# Patient Record
Sex: MALE
[De-identification: names, ages, dates, MRNs, and addresses within clinical notes are randomized per-mention and may not be internally consistent; named-entity substitution may affect disease eponyms.]

---

## 2024-07-11 ENCOUNTER — CARE COORDINATION (OUTPATIENT)
Dept: OTHER | Facility: CLINIC | Age: 69
End: 2024-07-11

## 2024-07-11 NOTE — CARE COORDINATION
Ambulatory Care Coordination Note     7/11/2024 4:32 PM     Received email response from [facility] that pt's location has been contacted and notified of need for amylodipine refill.     Akosua Balbuena RN BSN  [phone]

## 2024-07-11 NOTE — CARE COORDINATION
Ambulatory Care Coordination Note     2024 12:31 PM     Patient Current Location:  [place]     This patient was received as a referral from Population health report .    [facility] contacted the patient by telephone. (Patient returned call to [facility]) Verified name and  with patient as identifiers. Provided introduction to self, and explanation of the ACM role.   Patient accepted care management services at this time.          ACM: Akosua Balbuena RN     Challenges to be reviewed by the provider   Additional needs identified to be addressed with provider Yes  medications-Amylodipine needs refill ASAP. Email sent to [facility].               Method of communication with provider: chart routing.    Care Summary Note: Patient returned call to [facility] with noted anxiety. Patient states he is \"feeling fine,\" and all tests were \"fine\" in ED, and the \"medication problem with heart was my fault.\" He states he is taking his last available pill of amylodipine and he called pharmacy but they said they cannot get it until Tuesday. Pharmacy is [facility] in [place] ([phone]). ACM assured him I would call pharmacy to inquire about the delay and work to get this filled for him. He is very concerned as ED told him he would need a CT scan for a lung nodule found on x-ray. He has an apptmt scheduled with PCP Dr. Noemi Brown at [facility] on 24. Pt states his daughter brought him a blood pressure cuff and AC provided education on daily and as needed BP monitoring/logging and use of the device. Pt knowledgeable about red flags and when/where to seek treatment. Pt agreed to enroll in Care Management program and receive call from [facility] tomorrow morning for completion of enrollment assessments. 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 Assured pt [facility] would call before tomorrow with update on medication refill status.     Offered patient enrollment in the Remote

## 2024-07-11 NOTE — CARE COORDINATION
Ambulatory Care Coordination Note     7/11/2024 4:33 PM     Called patient to notify him that message has been relayed to [facility] of need for amylodipine refill. Patient expressed thanks for the call.      Akosua Balbuena RN BSN  [phone]

## 2024-07-11 NOTE — CARE COORDINATION
Ambulatory Care Coordination Note     7/11/2024 9:35 AM     Patient outreach attempt by this AC today to offer care management services. ACM was unable to reach the patient by telephone today; left voice message requesting a return phone call to this ACM.     ACM: Akosua Balbuena RN     Care Summary Note: Unable to reach patient.      Follow Up:   Plan for next ACM outreach in approximately 1-2 days  to complete:  - outreach attempt to offer care management services.      Akosua Balbuena RN BSN  [phone]

## 2024-07-12 ENCOUNTER — CARE COORDINATION (OUTPATIENT)
Dept: OTHER | Facility: CLINIC | Age: 69
End: 2024-07-12

## 2024-07-12 RX ORDER — TRIAMTERENE AND HYDROCHLOROTHIAZIDE 37.5; 25 MG/1; MG/1
1 TABLET ORAL DAILY
COMMUNITY

## 2024-07-12 RX ORDER — AMLODIPINE BESYLATE 5 MG/1
5 TABLET ORAL DAILY
COMMUNITY

## 2024-07-12 RX ORDER — VITAMIN B COMPLEX
1 CAPSULE ORAL DAILY
COMMUNITY

## 2024-07-12 RX ORDER — LISINOPRIL 40 MG/1
40 TABLET ORAL DAILY
COMMUNITY

## 2024-07-12 RX ORDER — ATORVASTATIN CALCIUM 20 MG/1
20 TABLET, FILM COATED ORAL DAILY
COMMUNITY

## 2024-07-12 RX ORDER — M-VIT,TX,IRON,MINS/CALC/FOLIC 27MG-0.4MG
1 TABLET ORAL DAILY
COMMUNITY

## 2024-07-12 RX ORDER — ZINC GLUCONATE 50 MG
30 TABLET ORAL DAILY
COMMUNITY

## 2024-07-12 SDOH — ECONOMIC STABILITY: HOUSING INSECURITY
IN THE LAST 12 MONTHS, WAS THERE A TIME WHEN YOU DID NOT HAVE A STEADY PLACE TO SLEEP OR SLEPT IN A SHELTER (INCLUDING NOW)?: NO

## 2024-07-12 SDOH — SOCIAL STABILITY: SOCIAL INSECURITY: WITHIN THE LAST YEAR, HAVE YOU BEEN AFRAID OF YOUR PARTNER OR EX-PARTNER?: NO

## 2024-07-12 SDOH — HEALTH STABILITY: PHYSICAL HEALTH: ON AVERAGE, HOW MANY DAYS PER WEEK DO YOU ENGAGE IN MODERATE TO STRENUOUS EXERCISE (LIKE A BRISK WALK)?: 5 DAYS

## 2024-07-12 SDOH — ECONOMIC STABILITY: HOUSING INSECURITY: IN THE LAST 12 MONTHS, HOW MANY PLACES HAVE YOU LIVED?: 1

## 2024-07-12 SDOH — ECONOMIC STABILITY: INCOME INSECURITY: HOW HARD IS IT FOR YOU TO PAY FOR THE VERY BASICS LIKE FOOD, HOUSING, MEDICAL CARE, AND HEATING?: NOT VERY HARD

## 2024-07-12 SDOH — SOCIAL STABILITY: SOCIAL INSECURITY
WITHIN THE LAST YEAR, HAVE TO BEEN RAPED OR FORCED TO HAVE ANY KIND OF SEXUAL ACTIVITY BY YOUR PARTNER OR EX-PARTNER?: NO

## 2024-07-12 SDOH — HEALTH STABILITY: MENTAL HEALTH
STRESS IS WHEN SOMEONE FEELS TENSE, NERVOUS, ANXIOUS, OR CAN'T SLEEP AT NIGHT BECAUSE THEIR MIND IS TROUBLED. HOW STRESSED ARE YOU?: TO SOME EXTENT

## 2024-07-12 SDOH — SOCIAL STABILITY: SOCIAL NETWORK
DO YOU BELONG TO ANY CLUBS OR ORGANIZATIONS SUCH AS CHURCH GROUPS UNIONS, FRATERNAL OR ATHLETIC GROUPS, OR SCHOOL GROUPS?: YES

## 2024-07-12 SDOH — HEALTH STABILITY: MENTAL HEALTH: HOW OFTEN DO YOU HAVE A DRINK CONTAINING ALCOHOL?: 4 OR MORE TIMES A WEEK

## 2024-07-12 SDOH — ECONOMIC STABILITY: FOOD INSECURITY: WITHIN THE PAST 12 MONTHS, YOU WORRIED THAT YOUR FOOD WOULD RUN OUT BEFORE YOU GOT MONEY TO BUY MORE.: NEVER TRUE

## 2024-07-12 SDOH — ECONOMIC STABILITY: FOOD INSECURITY: WITHIN THE PAST 12 MONTHS, THE FOOD YOU BOUGHT JUST DIDN'T LAST AND YOU DIDN'T HAVE MONEY TO GET MORE.: NEVER TRUE

## 2024-07-12 SDOH — SOCIAL STABILITY: SOCIAL INSECURITY
WITHIN THE LAST YEAR, HAVE YOU BEEN KICKED, HIT, SLAPPED, OR OTHERWISE PHYSICALLY HURT BY YOUR PARTNER OR EX-PARTNER?: NO

## 2024-07-12 SDOH — SOCIAL STABILITY: SOCIAL NETWORK: IN A TYPICAL WEEK, HOW MANY TIMES DO YOU TALK ON THE PHONE WITH FAMILY, FRIENDS, OR NEIGHBORS?: TWICE A WEEK

## 2024-07-12 SDOH — ECONOMIC STABILITY: INCOME INSECURITY: IN THE LAST 12 MONTHS, WAS THERE A TIME WHEN YOU WERE NOT ABLE TO PAY THE MORTGAGE OR RENT ON TIME?: NO

## 2024-07-12 SDOH — SOCIAL STABILITY: SOCIAL NETWORK: ARE YOU MARRIED, WIDOWED, DIVORCED, SEPARATED, NEVER MARRIED, OR LIVING WITH A PARTNER?: WIDOWED

## 2024-07-12 SDOH — SOCIAL STABILITY: SOCIAL INSECURITY: WITHIN THE LAST YEAR, HAVE YOU BEEN HUMILIATED OR EMOTIONALLY ABUSED IN OTHER WAYS BY YOUR PARTNER OR EX-PARTNER?: NO

## 2024-07-12 SDOH — ECONOMIC STABILITY: TRANSPORTATION INSECURITY
IN THE PAST 12 MONTHS, HAS LACK OF TRANSPORTATION KEPT YOU FROM MEETINGS, WORK, OR FROM GETTING THINGS NEEDED FOR DAILY LIVING?: NO

## 2024-07-12 SDOH — HEALTH STABILITY: MENTAL HEALTH: HOW MANY STANDARD DRINKS CONTAINING ALCOHOL DO YOU HAVE ON A TYPICAL DAY?: 1 OR 2

## 2024-07-12 SDOH — SOCIAL STABILITY: SOCIAL NETWORK: HOW OFTEN DO YOU GET TOGETHER WITH FRIENDS OR RELATIVES?: TWICE A WEEK

## 2024-07-12 SDOH — SOCIAL STABILITY: SOCIAL NETWORK: HOW OFTEN DO YOU ATTENT MEETINGS OF THE CLUB OR ORGANIZATION YOU BELONG TO?: MORE THAN 4 TIMES PER YEAR

## 2024-07-12 SDOH — SOCIAL STABILITY: SOCIAL NETWORK: HOW OFTEN DO YOU ATTEND CHURCH OR RELIGIOUS SERVICES?: MORE THAN 4 TIMES PER YEAR

## 2024-07-12 SDOH — HEALTH STABILITY: PHYSICAL HEALTH: ON AVERAGE, HOW MANY MINUTES DO YOU ENGAGE IN EXERCISE AT THIS LEVEL?: 120 MIN

## 2024-07-12 SDOH — ECONOMIC STABILITY: TRANSPORTATION INSECURITY
IN THE PAST 12 MONTHS, HAS THE LACK OF TRANSPORTATION KEPT YOU FROM MEDICAL APPOINTMENTS OR FROM GETTING MEDICATIONS?: NO

## 2024-07-12 NOTE — CARE COORDINATION
Ambulatory Care Coordination Note     7/12/2024 1:53 PM     Notified by [facility] that Cardiology referral has been sent to Dr. Mickey Alicia's office, and they will call patient to schedule appt.     Akosua Balbuena RN BSN  [phone]

## 2024-07-12 NOTE — CARE COORDINATION
Ambulatory Care Coordination Note     7/12/2024 3:46 PM     Patient called and left message for ACM, stating he repositioned the BP cuff and reading is now 168/89.     Akosua Balbuena RN BSN  [phone]

## 2024-07-12 NOTE — CARE COORDINATION
Ambulatory Care Coordination Note     7/12/2024 1:57 PM     Called pt to let him know referral to cardiology has been sent to Cardiology by [facility], and Dr. Alicia's office will call him to set up visit. Pt rechecked BP while on phone with CT Nurse. BP reading is 181/98.  Pt knowledgeable about red flags relevant to condition, and when/where to seek treatment. Encouraged pt to call ACM with any questions or concerns.     Akosua Balbuena RN BSN  [phone]

## 2024-07-12 NOTE — CARE COORDINATION
Ambulatory Care Coordination Note     2024 10:04 AM     Patient Current Location:  [place]     This patient was received as a referral from [facility] .    ACM contacted the patient by telephone. Verified name and  with patient as identifiers. Provided introduction to self, and explanation of the ACM role.   Patient accepted care management services at this time.          ACM: Akosua Balbuena RN     Challenges to be reviewed by the provider   Additional needs identified to be addressed with provider Yes  Patient needs referral to Cardiology - Pt requests Dr. Mickey Alicia   Per patient, he needs CT of chest for lung nodule found in ED.  Needs coordinated med refill dates.            Method of communication with provider: chart routing.    Care Summary Note: Patient reports he is \"doing good\" this morning. He picked up amlodipine last night and has started taking. He has started logging daily and PRN blood pressures and will bring to PCP visit on 24 with Dr. Brown. Blood pressure this AM before meds is 176/99. Patient checked BP during our call and it is 204/95, one hour after meds. He is asymptomatic and states he never has symptoms, but reports strong family history of cardiac disease and his father had MI. Patient has noted anxiety which lessened slightly over the course of the conversation. He states ED referred him to follow-up with Cardiology. He would like to see Dr. Mickey Alicia. ACM sent request to [facility], to make referral. He was also notified in ED that lung nodule was found on x-ray, which requires CT scan for follow-up. Full medication review was completed. Barriers to adherence include lack of coordinated refill dates and need for multiple trips to pharmacy. He will discuss this at next PCP appt. Patient states he has had HTN for at least three years and follows-up with PCP every six months. Pt states he \"doesn't normally worry about things,\" but voices

## 2024-07-19 ENCOUNTER — CARE COORDINATION (OUTPATIENT)
Dept: OTHER | Facility: CLINIC | Age: 69
End: 2024-07-19

## 2024-07-19 NOTE — CARE COORDINATION
appointment or reschedule if I have to cancel.  I will notify my provider of any barriers to my plan of care.  I will follow my Zone Management tool to seek urgent or emergent care.  I will notify my provider of any symptoms that indicate a worsening of my condition.    Barriers: fear of failure and stress  Plan for overcoming my barriers: working with ACM  Confidence: 10/10  Anticipated Goal Completion Date: 10/11/24    07/19/24 Patient has earliest available Cardiology appt scheduled for 09/17/24, and plans to follow-up with [facility] PCP on 07/24/24.          Remain at or below target Blood Pressure   On track      Check blood pressure daily each morning before medication  Log blood pressures daily   Check blood pressure if feeling any headaches, vision changes, shortness of breath, tightness in chest  Provide blood pressure log to PCP at visits  Cardiology consult     07/11/24 Patient verbalizes understanding and plans to implement these interventions today.   07/12/24 Patient has started daily BP log. BP this AM = 176/99 before meds  07/19/24 Patient is logging blood pressures daily SBPs running 150-170, DBPs running 80-90. BP this morning - 170/90               PCP/Specialist follow up:     Follow-up Information       Follow up With Specialties Details Why Contact Info    Noemi Reardon DO Family Medicine Go on 7/24/2024  [address] [facility] [address] [phone]      Mickey Alicia MD Cardiothoracic Surgery Go on 9/17/2024 New patient appt, ED Follow-up [address] [address]  [phone]               Follow Up:   Plan for next ACM outreach in approximately 1 week to complete:  - CC Protocol assessments  - disease specific assessments  - goal progression  - follow up appointment with PCP .   Patient  is agreeable to this plan.     Akosua Balbuena RN BSN  Ambulatory Care Manager  [phone]  [email or web address]

## 2024-07-19 NOTE — CARE COORDINATION
Ambulatory Care Coordination Note     7/19/2024 11:04 AM     Email sent to [facility] requesting CT Chest order be sent to diagnostics location near [facility] location.     Akosua Balbuena RN BSN  [phone]

## 2024-07-22 ENCOUNTER — CARE COORDINATION (OUTPATIENT)
Dept: OTHER | Facility: CLINIC | Age: 69
End: 2024-07-22

## 2024-07-22 NOTE — CARE COORDINATION
Ambulatory Care Coordination Note     7/22/2024 9:04 AM     Received email response from [facility] that message has been relayed to [facility] regarding request for chest CT scan.    Akosua Balbuena RN BSN  [phone]

## 2024-07-26 ENCOUNTER — CARE COORDINATION (OUTPATIENT)
Dept: OTHER | Facility: CLINIC | Age: 69
End: 2024-07-26

## 2024-07-26 NOTE — CARE COORDINATION
Ambulatory Care Coordination Note     7/26/2024 9:19 AM     Patient outreach attempt by this ACM today to perform care management follow up . ACM was unable to reach the patient by telephone today; left voice message requesting a return phone call to this ACM.     ACM: Akosua Balbuena RN     Care Summary Note: Unable to reach patient at this time.      PCP/Specialist follow up:     Mickey Alicia MD Cardiothoracic Surgery Go on 9/17/2024 New patient appt, ED Follow-up [address] [place] [address]  [phone]       Follow Up:   Plan for next AC outreach in approximately 1 week to complete:  - CC Protocol assessments  - disease specific assessments  - goal progression  - follow-up appointment with PCP 07/24/24 .       Akosua Balbuena RN BSN  [phone]

## 2024-07-31 ENCOUNTER — CARE COORDINATION (OUTPATIENT)
Dept: OTHER | Facility: CLINIC | Age: 69
End: 2024-07-31

## 2024-07-31 RX ORDER — AMLODIPINE BESYLATE 10 MG/1
10 TABLET ORAL DAILY
COMMUNITY
Start: 2024-07-24

## 2024-07-31 NOTE — CARE COORDINATION
Ambulatory Care Coordination Note     2024 10:39 AM     Patient Current Location:  [place]     ACM contacted the patient by telephone. Verified name and  with patient as identifiers.         ACM: Akosua Balbuena RN     Challenges to be reviewed by the provider   Additional needs identified to be addressed with provider No  none               Method of communication with provider: chart routing.    Care Summary Note: Patient saw PCP at [facility] on 24 and amlodipine was increased to 10 mg. Patient has started the higher dose and reports blood pressures have improved significantly and mostly have been in normal range. He continues to log blood pressures and will follow-up with PCP on 24. At this time CT Chest order request will also be discussed. Patient has no hyper- or hypotensive symptoms. He states his sleep has not improved as he is caring for grandchildren and child's dog which can impede sleep. He will see Cardiologist on 24 (corrected from 24 on last note). His left knee pain is worsened with heavy physical activity but is tolerable with rest, topical medicated cream, and OTC NSAIDs as needed. Pt is knowledgeable about red flags relevant to condition, and when/where to seek treatment. Encouraged pt to call ACM with any questions or concerns prior to our next follow-up call in two weeks.        Offered patient enrollment in the Remote Patient Monitoring (RPM) program for in-home monitoring: Patient is not eligible for RPM program because: affiliate provider.     Assessments Completed:   Hypertension - Encounter Level    Symptom course: stable       and   General Assessment    Do you have any symptoms that are causing concern?: No          Medications Reviewed:   Completed during this call    Advance Care Planning:   Reviewed during previous call      Care Planning:    Goals Addressed                   This Visit's Progress     Conditions and Symptoms   On track     I

## 2024-08-14 ENCOUNTER — CARE COORDINATION (OUTPATIENT)
Dept: OTHER | Facility: CLINIC | Age: 69
End: 2024-08-14

## 2024-08-14 NOTE — CARE COORDINATION
appointment with Cardiology .   Patient  is agreeable to this plan.     Akosua Balbuena RN BSN  Ambulatory Care Manager  [phone]  [email or web address]

## 2024-09-20 ENCOUNTER — CARE COORDINATION (OUTPATIENT)
Dept: OTHER | Facility: CLINIC | Age: 69
End: 2024-09-20

## 2024-09-20 RX ORDER — CARVEDILOL 12.5 MG/1
12.5 TABLET ORAL 2 TIMES DAILY WITH MEALS
COMMUNITY

## 2024-09-23 ENCOUNTER — CARE COORDINATION (OUTPATIENT)
Dept: OTHER | Facility: CLINIC | Age: 69
End: 2024-09-23

## 2024-09-25 ENCOUNTER — CARE COORDINATION (OUTPATIENT)
Dept: OTHER | Facility: CLINIC | Age: 69
End: 2024-09-25

## 2024-10-25 ENCOUNTER — CARE COORDINATION (OUTPATIENT)
Dept: OTHER | Facility: CLINIC | Age: 69
End: 2024-10-25

## 2024-10-25 NOTE — CARE COORDINATION
Ambulatory Care Coordination Note     10/25/2024 9:04 AM     Patient outreach attempt by this AC today to perform care management follow up . ACM was unable to reach the patient by telephone today; left voice message requesting a return phone call to this ACM.     ACM: Akosua Balbuena RN     Care Summary Note: Unable to reach patient at this time.        Follow Up:   Plan for next AC outreach in approximately 1 week to complete:  - disease specific assessments  - goal progression  - follow-up appointment with Cardiology review .      Akosua Balbuena RN BSN  [phone]

## 2024-10-29 ENCOUNTER — CARE COORDINATION (OUTPATIENT)
Dept: OTHER | Facility: CLINIC | Age: 69
End: 2024-10-29

## 2024-10-29 NOTE — CARE COORDINATION
Ambulatory Care Coordination Note     10/29/2024 9:04 AM     Patient Current Location:  [place]     ACM contacted the patient by telephone. Verified name and  with patient as identifiers.         ACM: Akosua Balbuena RN     Challenges to be reviewed by the provider   Additional needs identified to be addressed with provider No  none               Method of communication with provider: phone.    Has the patient been seen in the ED since your last call? no    Care Summary Note: Patient reports he is starting to feel much better after respiratory infection in which he was referred by Cardiology to be evaluated by PCP ([facility]) for crackles/wheezing on 10/23/24. Pt states he did not have pneumonia but was advised to continue taking antibiotic, which he has done. No shortness of breath and pt states he is breathing easier now. He previously had WELSH and harsh cough. Advised pt to return to PF if SOB/cough worsens and pt voiced understanding. Reviewed results of sleep study and Cardiology visit on 10/23/24. CPAP was recommended and pt is awaiting return call from Pulmonology. 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 Carvedilol was increased to 25mg BID and pt is taking as prescribed. BP has improved and is running around 130/80 which is target. Pt verbalized understanding of need to call office if persistently >130/80 or SBP <100. He continues to log BP daily. Pt will follow-up on renal US and CT Chest orders under [facility] and will f/u [facility] Cardiology again on 24. Reviewed Cardiology recommendation for weight loss and pt states he has lost 10# since respiratory illness. Educated pt on how weight loss will help improve DIPESH. Pt knowledgeable about red flags relevant to condition, and when/where to seek treatment. Encouraged pt to call ACM with any questions or concerns. Will follow-up in two

## 2024-10-29 NOTE — CARE COORDINATION
Ambulatory Care Coordination Note     10/29/2024 9:33 AM     Call placed to [facility] at spoke to Pat. Pt needs to return for second half of test for titration of CPAP.     Akosua Balbuena RN BSN  [phone]

## 2024-11-13 ENCOUNTER — CARE COORDINATION (OUTPATIENT)
Dept: OTHER | Facility: CLINIC | Age: 69
End: 2024-11-13

## 2024-11-13 NOTE — CARE COORDINATION
Ambulatory Care Coordination Note     2024 9:58 AM     Patient Current Location:  [place]     ACM contacted the patient by telephone. Verified name and  with patient as identifiers.         ACM: Akosua Balbuena RN     Challenges to be reviewed by the provider   Additional needs identified to be addressed with provider No  none               Method of communication with provider: phone.    Utilization: Patient has not had any utilization since our last call.     Care Summary Note: Patient reports having no more cold symptoms except occasional congestion. He is scheduled for the second part of his sleep study on 24. BP has been fluctuating and increases with poor sleep. BP occasionally above 130/80 but not consistent. Pt plans to follow-up with CT chest after sleep study completed and he states referral order valid until 2025. No immediate concerns at this time and pt is agreeable to follow-up call in 2-3 weeks.     Offered patient enrollment in the Remote Patient Monitoring (RPM) program for in-home monitoring: Patient is not eligible for RPM program because: affiliate provider.     Assessments Completed:   Hypertension - Encounter Level    Symptom course: improving      ,   General Assessment    Do you have any symptoms that are causing concern?: No          Medications Reviewed:   Patient denies any changes with medications and reports taking all medications as prescribed.    Advance Care Planning:   Reviewed during previous call      Care Planning:    Goals Addressed                   This Visit's Progress     Conditions and Symptoms   On track     I will schedule office visits, as directed by my provider.  I will keep my appointment or reschedule if I have to cancel.  I will notify my provider of any barriers to my plan of care.  I will follow my Zone Management tool to seek urgent or emergent care.  I will notify my provider of any symptoms that indicate a worsening of my

## 2024-12-04 ENCOUNTER — CARE COORDINATION (OUTPATIENT)
Dept: OTHER | Facility: CLINIC | Age: 69
End: 2024-12-04

## 2024-12-04 NOTE — CARE COORDINATION
Symptoms:  (Comment: 12/04/24 Recent lightheadedness on new dose of carvedilol)  Symptom course: improving      ,   General Assessment    Do you have any symptoms that are causing concern?: No          Medications Reviewed:   Completed during a previous call     Advance Care Planning:   Reviewed during previous call      Care Planning:    Goals Addressed                   This Visit's Progress     Conditions and Symptoms   On track     I will schedule office visits, as directed by my provider.  I will keep my appointment or reschedule if I have to cancel.  I will notify my provider of any barriers to my plan of care.  I will follow my Zone Management tool to seek urgent or emergent care.  I will notify my provider of any symptoms that indicate a worsening of my condition.    Barriers: fear of failure and stress  Plan for overcoming my barriers: working with ACM  Confidence: 10/10  Anticipated Goal Completion Date: 10/11/24    07/19/24 Patient has earliest available Cardiology appt scheduled for 09/17/24, and plans to follow-up with [facility] PCP on 07/24/24.  09/20/24 Cardiology follow-up on 09/18/24 completed       Remain at or below target Blood Pressure        Check blood pressure daily each morning before medication  Log blood pressures daily   Check blood pressure if feeling any headaches, vision changes, shortness of breath, tightness in chest  Provide blood pressure log to PCP at visits  Cardiology consult     07/11/24 Patient verbalizes understanding and plans to implement these interventions today.   07/12/24 Patient has started daily BP log. BP this AM = 176/99 before meds  07/19/24 Patient is logging blood pressures daily SBPs running 150-170, DBPs running 80-90. BP this morning - 170/90  09/20/24 Pt continues to log BPs daily. BP at Cardiology office 162/98 on 09/18/24.  10/29/24 Pt logging BP daily. Has improved since carvedilol increase and staying around 130/80 which is target per

## 2024-12-30 ENCOUNTER — CARE COORDINATION (OUTPATIENT)
Dept: OTHER | Facility: CLINIC | Age: 69
End: 2024-12-30

## 2024-12-30 NOTE — CARE COORDINATION
Ambulatory Care Coordination Note     12/30/2024 8:51 AM     Patient outreach attempt by this ACM today to perform care management follow up . ACM was unable to reach the patient by telephone today;   left voice message requesting a return phone call to this ACM.     ACM: Akosua Balbuena RN     Care Summary Note: Unable to reach patient at this time.      PCP/Specialist follow up: Cardiology   Future Appointments         Provider Specialty Dept Phone    2/10/2025 8:00 AM PROVIDER, OTHER              Follow Up:   Plan for next ACM outreach in approximately 1 week to complete:  - goal progression  - follow-up appointment with [facility]/CT Abdomen .      Akosua Balbuena RN BSN  [phone]

## 2025-01-06 ENCOUNTER — CARE COORDINATION (OUTPATIENT)
Dept: OTHER | Facility: CLINIC | Age: 70
End: 2025-01-06

## 2025-01-06 RX ORDER — ASPIRIN 81 MG/1
81 TABLET ORAL DAILY
COMMUNITY

## 2025-01-06 NOTE — CARE COORDINATION
Ambulatory Care Coordination Note     2025 3:46 PM     Patient Current Location:  [place]     ACM contacted the patient by telephone. Verified name and  with patient as identifiers.         ACM: Akosua Balbuena RN     Challenges to be reviewed by the provider   Additional needs identified to be addressed with provider Yes  medications-Lipitor increased to 40 mg in ED on 24. Pt only taking half dose of carvedilol due to lightheadedness and hypotension. Email sent to [facility].                Method of communication with provider: staff message.    Utilization: Has the patient been seen in the ED since your last call? Yes,   Discharge Date: 24  Discharge Facility: [facility]   Reason for ED Visit: Abnormal CT scan  Visit Diagnosis: Abnormal CT of Abdomen    Number of ED visits in the last 6 months: 2      Do you have any ongoing symptoms? No  Did you call your PCP prior to going to the ED?  Advised to come in to ED from hospital after CT scan    Review of Discharge Instructions:   [x] AVS discharge instructions  [] Right Care, Right Place, Right Time document  [x] Medication changes  [x] Follow up appointments  [] Referral follow up        Care Summary Note: Pt states he was told in ED that abnormal CT Scan showed bunching of blood vessels appearing to be a mass, but that this was nothing to worry about, and recommendation was given for abdominal CT scans every 6 months to one year to monitor. Noted 2.5cm AAA in hospital notes.Lipitor was increased at the ED visit to 40mg and ASA 81mg QD was added. Pt states he will need refill on Lipitor and is not sure if he should go back to see Dr. Truong (PCP at [facility]) since he just saw her on 24. Pt also reports having decreased carvedilol due to lightheadedness and low BP in afternoons and he has not yet notified Cardiology. He has follow-up scheduled for 02/10/25. ACM informed pt I would send a message to [facility] noting his

## 2025-01-06 NOTE — CARE COORDINATION
Ambulatory Care Coordination Note     2025 3:58 PM     Email sent to [facility]:    Hello,  Regarding patient of Ham Russell 1955    Pt was seen in ED on 24 for abnormal Abdominal CT completed earlier that day.   No surgical intervention was required, and it was recommended pt have abdominal CT scan q6mo-1 year.  In the ED, Lipitor was increased to 40mg daily and ASA 81mg QD was added.   Pt has also recently decreased his carvedilol to 12.5mg from 25mg due to lightheadedness in the afternoons and low BP (~100/60).  He has not notified Cardiology yet and has Card follow-up on 02/10/25.   Pt saw Dr. Truong on 24 and is inquiring whether he should come back in to see Dr. Truong prior to next regular visit.  He does need refill for new Lipitor dose and is requesting 90 day supply.     Please advise, and thank you!    Akosua Balbuena RN BSN  Ambulatory Care Manager  [facility]   [facility] [phone]  [email or web address]

## 2025-01-06 NOTE — CARE COORDINATION
Ambulatory Care Coordination Note     1/6/2025 9:32 AM     Patient outreach attempt by this ACM today to perform care management follow up . ACM was unable to reach the patient by telephone today;   left voice message requesting a return phone call to this ACM.     ACM: Akosua Balbuena RN     Care Summary Note: Unable to reach patient at this time.      PCP/Specialist follow up: Cardiology   Future Appointments         Provider Specialty Dept Phone    2/10/2025 8:00 AM PROVIDER, OTHER              Follow Up:   Plan for next ACM outreach in approximately 1 week to complete:  - goal progression  - follow-up appointment with PCP  - hospital follow up.      Akosua Balbuena RN BSN  [phone]

## 2025-01-08 ENCOUNTER — CARE COORDINATION (OUTPATIENT)
Dept: OTHER | Facility: CLINIC | Age: 70
End: 2025-01-08

## 2025-01-08 NOTE — CARE COORDINATION
Ambulatory Care Coordination Note     1/8/2025 10:52 AM     Received email response from [facility] that PF center spoke with pt about coming in to office for follow-up, pt to notify Cardiology of change in carvedilol dose, and that Dr. Truong sent script for Lipitor 40mg to his pharmacy.     Akosua Balbuena RN BSN  [phone]

## 2025-01-22 ENCOUNTER — CARE COORDINATION (OUTPATIENT)
Dept: OTHER | Facility: CLINIC | Age: 70
End: 2025-01-22

## 2025-01-22 NOTE — CARE COORDINATION
Ambulatory Care Coordination Note     2025 9:11 AM     Patient Current Location:  [place]     ACM contacted the patient by telephone. Verified name and  with patient as identifiers.         ACM: Akosua Balbuena RN     Challenges to be reviewed by the provider   Additional needs identified to be addressed with provider No  none               Method of communication with provider: chart routing.    Utilization: Patient has not had any utilization since our last call.     Care Summary Note: Patient states he is doing well. SBPs have been in 120s usually, with occasional readings ~140. Since reducing carvedilol, he has had no further lightheadedness. Pt states he must have misunderstood direction given from [facility] about returning there after recent ED visit, but prefers to wait to see Cardiologist on 02/10/25. Pt confirmed he received Lipitor 40mg prescription from  and is taking along with ASA 81mg as prescribed at ED. He also contacted Sleep center and was told he will need to see Cardiologist before CPAP can be delivered. Pt states he has a few good nights of sleep, but is eagerly anticipating receiving and using the CPAP machine. Pt reports no further weight loss as this has stalled a bit due to family now living with him. Will plan to follow-up in one month and consider for graduation from CM program in March.      Offered patient enrollment in the Remote Patient Monitoring (RPM) program for in-home monitoring: Patient is not eligible for RPM program because: affiliate provider.     Assessments Completed:   Hypertension - Encounter Level    Symptom course: stable      ,   General Assessment    Do you have any symptoms that are causing concern?: No          Medications Reviewed:   Patient denies any changes with medications and reports taking all medications as prescribed.    Advance Care Planning:   Reviewed during previous call      Care Planning:   Not completed during this

## 2025-02-12 ENCOUNTER — CARE COORDINATION (OUTPATIENT)
Dept: OTHER | Facility: CLINIC | Age: 70
End: 2025-02-12

## 2025-02-12 RX ORDER — CETIRIZINE HYDROCHLORIDE 10 MG/1
10 TABLET ORAL DAILY
COMMUNITY

## 2025-02-12 NOTE — CARE COORDINATION
Ambulatory Care Coordination Note     2/12/2025 1:52 PM     Called and left  for pt informing him of Cardiology refaxing Pulmonology referral today, and that he may call the office to schedule appt with them as well.     Akosua Balbuena RN BSN  [phone]

## 2025-02-12 NOTE — CARE COORDINATION
Ambulatory Care Coordination Note     2/12/2025 1:45 PM     Lucrecia from [facility] returned call and left message stating Pulmonology referral was refaxed to them on 02/10/25 and again today. Pt may also call the office directly to schedule appt.    Akosua Balbuena RN BSN  [phone]

## 2025-02-12 NOTE — CARE COORDINATION
Ambulatory Care Coordination Note     2/12/2025 11:01 AM     Called Cardiology office to inquire about Pulmonology referral for CPAP. Left VM requesting return call.     Akosua Balbuena RN BSN  [phone]

## 2025-02-12 NOTE — CARE COORDINATION
Ambulatory Care Coordination Note     2025 10:44 AM     Patient Current Location:  [place]     ACM contacted the patient by telephone. Verified name and  with patient as identifiers.         ACM: Akosua Balbuena RN     Challenges to be reviewed by the provider   Additional needs identified to be addressed with provider Yes  Pumonology referral status for CPAP                Method of communication with provider: phone.    Utilization: Patient has not had any utilization since our last call.     Care Summary Note: Pt reports BP has been stable, pulse has been lower as well, ~60s. Educated pt to notify Cardiology if drops below 50. No recent symptoms of lightheadedness and pt reports taking all medications as prescribed now including Lipitor and ASA. Reviewed Cardiology visit notes from 02/10/25. Pt is to check BP 1 hour after meds 2-3 times per week and log to share at next appt. Pt had 1# BLE edema at visit and NP suggested compression stockings. ACM provided education on what to purchase OTC. He will also have doppler US on BLE on 25. Pt is still waiting for Pulmonology referral to order CPAP supplies and NP noted office will contact them again to inquire on delay. Pt is agreeable to ACM calling their office to see if any support is needed for this. Pt c/o some lingering nasal congestion and NP recommended he see an allergist. Pt started Zyrtec last night and will take for 30 days to see if this is helpful. Cardiology NP also recommended weight loss and gave dietary recommendations (Prediabetes). Pt states he drinks a large amount of cranberry juice throughout the day. ACM provided education on carbohydrates, added sugars in juice, monitoring food labels, carbohydrate reduction, and getting adequate amounts of protein in his diet. Pt voiced understanding and states he didn't realize how much sugar was in the juice. Pt will follow-up with Cardiology in 6 months (Dr. Flores) and plans to see PCP,

## 2025-02-26 ENCOUNTER — CARE COORDINATION (OUTPATIENT)
Dept: OTHER | Facility: CLINIC | Age: 70
End: 2025-02-26

## 2025-02-26 NOTE — CARE COORDINATION
Ambulatory Care Coordination Note     2025 9:08 AM     Patient Current Location:  [place]     ACM contacted the patient by telephone. Verified name and  with patient as identifiers.         ACM: Akosua Balbuena RN     Challenges to be reviewed by the provider   Additional needs identified to be addressed with provider No  none               Method of communication with provider: phone.    Utilization: Patient has not had any utilization since our last call.     Care Summary Note: Pt reports BP and HR have been in ideal range (see below). He has appt scheduled with Pulmonology on 25 to address need to initiate CPAP. Pt states he still has some slight edema in BLE and doppler is planned for 25. He has two pairs of compression stockings and has begun using them without issue. Pt stopped taking Zyrtec daily as he believes it was causing him not to sleep well. Pt states he has been sleeping a \"little better\" since then. Pt has been diligent in watching food labels to reduce carbohydrates and has switched to low sugar cranberry juice. ACM provided education on ensuring he is getting enough protein daily to help with satiety. Pt verbalized understanding and was very thankful for all CM assistance that has been provided. Will plan to follow-up in one month and review for graduation at that time.     Offered patient enrollment in the Remote Patient Monitoring (RPM) program for in-home monitoring: Patient is not eligible for RPM program because: affiliate provider.     Assessments Completed:   Hypertension - Encounter Level    Symptom course: improving (Comment: 25 BP 120s/60-70s. Pulse 60s)      ,   General Assessment    Do you have any symptoms that are causing concern?: No          Medications Reviewed:   Pt is taking all medications as prescribed, but has stopped OTC Zyrtec.    Advance Care Planning:   Reviewed during previous call      Care Planning:    Goals Addressed

## 2025-03-26 ENCOUNTER — CARE COORDINATION (OUTPATIENT)
Dept: OTHER | Facility: CLINIC | Age: 70
End: 2025-03-26

## 2025-03-26 NOTE — CARE COORDINATION
Ambulatory Care Coordination Note     3/26/2025 10:06 AM     Patient Current Location:  [place]     ACM contacted the patient by telephone. Verified name and  with patient as identifiers.     Patient graduated from the High Risk Care Management program on 3/26/2025.  Patient verbalizes confidence in the ability to self-manage at this time. has the ability to self-manage at this time..  Care management goals have been completed. No further Ambulatory Care Manager follow up scheduled.      ACM: Akosua Balbuena RN     Challenges to be reviewed by the provider   Additional needs identified to be addressed with provider No  none               Method of communication with provider: chart routing.    Utilization: Patient has not had any utilization since our last call.     Care Summary Note: Pt reports BP and HR have been in target range, and he now has CPAP which has been helpful to provide more restful sleep, although he reports still \"getting used to it,\" and Sleep Medicine provider calls frequently to check on pt's progress. Pt still has some BLE edema at times and continues to wear compression stockings. Weight has been stable and pt is continuing to work on reducing concentrated carbohydrate intake and plans to increase exercise as weather improves. Pt has had no recent ED or IP utilization. Pt voices no other concerns or CM needs and was very  thankful for all support provided while on program. Pt knowledgeable about red flags relevant to condition, and when/where to seek treatment. Pt will graduate from HRCM program as of today. Encouraged pt to call ACM with any future CM needs.     Offered patient enrollment in the Remote Patient Monitoring (RPM) program for in-home monitoring: Patient is not eligible for RPM program because: affiliate provider.     Assessments Completed:   Ambulatory Care Coordination Assessment    Care Coordination Protocol  Referral from Primary Care Provider: No  Week 1 - Initial